# Patient Record
Sex: MALE | Race: OTHER | NOT HISPANIC OR LATINO | Employment: FULL TIME | URBAN - METROPOLITAN AREA
[De-identification: names, ages, dates, MRNs, and addresses within clinical notes are randomized per-mention and may not be internally consistent; named-entity substitution may affect disease eponyms.]

---

## 2022-01-28 ENCOUNTER — OFFICE VISIT (OUTPATIENT)
Dept: URGENT CARE | Facility: CLINIC | Age: 52
End: 2022-01-28

## 2022-01-28 VITALS
WEIGHT: 175 LBS | HEIGHT: 70 IN | HEART RATE: 98 BPM | BODY MASS INDEX: 25.05 KG/M2 | SYSTOLIC BLOOD PRESSURE: 108 MMHG | OXYGEN SATURATION: 93 % | DIASTOLIC BLOOD PRESSURE: 68 MMHG | TEMPERATURE: 97.9 F | RESPIRATION RATE: 18 BRPM

## 2022-01-28 DIAGNOSIS — Z20.822 SUSPECTED COVID-19 VIRUS INFECTION: ICD-10-CM

## 2022-01-28 DIAGNOSIS — J02.0 STREPTOCOCCAL PHARYNGITIS: Primary | ICD-10-CM

## 2022-01-28 DIAGNOSIS — J02.9 PHARYNGITIS, UNSPECIFIED ETIOLOGY: ICD-10-CM

## 2022-01-28 PROBLEM — F17.200 TOBACCO DEPENDENCE: Status: ACTIVE | Noted: 2022-01-28

## 2022-01-28 LAB
INT CON NEG: NORMAL
INT CON POS: NORMAL
S PYO AG THROAT QL: POSITIVE

## 2022-01-28 PROCEDURE — 87880 STREP A ASSAY W/OPTIC: CPT | Performed by: NURSE PRACTITIONER

## 2022-01-28 PROCEDURE — 99203 OFFICE O/P NEW LOW 30 MIN: CPT | Mod: 25,CS | Performed by: NURSE PRACTITIONER

## 2022-01-28 PROCEDURE — 96372 THER/PROPH/DIAG INJ SC/IM: CPT | Performed by: NURSE PRACTITIONER

## 2022-01-28 RX ORDER — AMOXICILLIN 500 MG/1
500 CAPSULE ORAL 2 TIMES DAILY
Qty: 20 CAPSULE | Refills: 0 | Status: SHIPPED | OUTPATIENT
Start: 2022-01-28 | End: 2022-02-07

## 2022-01-28 RX ORDER — DEXAMETHASONE SODIUM PHOSPHATE 10 MG/ML
10 INJECTION INTRAMUSCULAR; INTRAVENOUS ONCE
Status: COMPLETED | OUTPATIENT
Start: 2022-01-28 | End: 2022-01-28

## 2022-01-28 RX ADMIN — DEXAMETHASONE SODIUM PHOSPHATE 10 MG: 10 INJECTION INTRAMUSCULAR; INTRAVENOUS at 11:24

## 2022-01-28 ASSESSMENT — ENCOUNTER SYMPTOMS
MYALGIAS: 0
TROUBLE SWALLOWING: 1
NAUSEA: 0
HOARSE VOICE: 1
HEADACHES: 0
COUGH: 0
VOMITING: 0
FEVER: 0
DIARRHEA: 0
SORE THROAT: 1
CHILLS: 0
ABDOMINAL PAIN: 0

## 2022-01-28 NOTE — PROGRESS NOTES
Subjective:     Julieta Garcia is a 51 y.o. male who presents for Sore Throat (x 2 days, severe sore throat, burning, pain, congestion)      Pharyngitis   This is a new problem. The current episode started in the past 7 days (Julieta is a pleasant 51 year old male who presents to  today with complaints of a sore throat that started 2 days ago). The problem has been gradually worsening. There has been no fever. The pain is severe. Associated symptoms include congestion, a hoarse voice and trouble swallowing. Pertinent negatives include no abdominal pain, coughing, diarrhea, headaches or vomiting. He has tried nothing for the symptoms.         Review of Systems   Constitutional: Negative for chills, fever and malaise/fatigue.   HENT: Positive for congestion, hoarse voice, sore throat and trouble swallowing.    Respiratory: Negative for cough.    Gastrointestinal: Negative for abdominal pain, diarrhea, nausea and vomiting.   Musculoskeletal: Negative for myalgias.   Neurological: Negative for headaches.       PMH: History reviewed. No pertinent past medical history.  ALLERGIES: No Known Allergies  SURGHX: History reviewed. No pertinent surgical history.  SOCHX:   Social History     Socioeconomic History   • Marital status:      Spouse name: Not on file   • Number of children: Not on file   • Years of education: Not on file   • Highest education level: Not on file   Occupational History   • Not on file   Tobacco Use   • Smoking status: Never Smoker   • Smokeless tobacco: Never Used   Vaping Use   • Vaping Use: Never used   Substance and Sexual Activity   • Alcohol use: Yes     Alcohol/week: 0.6 oz     Types: 1 Glasses of wine per week   • Drug use: Never   • Sexual activity: Not on file   Other Topics Concern   • Not on file   Social History Narrative   • Not on file     Social Determinants of Health     Financial Resource Strain:    • Difficulty of Paying Living Expenses: Not on file   Food Insecurity:    •  "Worried About Running Out of Food in the Last Year: Not on file   • Ran Out of Food in the Last Year: Not on file   Transportation Needs:    • Lack of Transportation (Medical): Not on file   • Lack of Transportation (Non-Medical): Not on file   Physical Activity:    • Days of Exercise per Week: Not on file   • Minutes of Exercise per Session: Not on file   Stress:    • Feeling of Stress : Not on file   Social Connections:    • Frequency of Communication with Friends and Family: Not on file   • Frequency of Social Gatherings with Friends and Family: Not on file   • Attends [employer] Services: Not on file   • Active Member of Clubs or Organizations: Not on file   • Attends Club or Organization Meetings: Not on file   • Marital Status: Not on file   Intimate Partner Violence:    • Fear of Current or Ex-Partner: Not on file   • Emotionally Abused: Not on file   • Physically Abused: Not on file   • Sexually Abused: Not on file   Housing Stability:    • Unable to Pay for Housing in the Last Year: Not on file   • Number of Places Lived in the Last Year: Not on file   • Unstable Housing in the Last Year: Not on file     FH: History reviewed. No pertinent family history.      Objective:   /68   Pulse 98   Temp 36.6 °C (97.9 °F) (Temporal)   Resp 18   Ht 1.778 m (5' 10\")   Wt 79.4 kg (175 lb)   SpO2 93%   BMI 25.11 kg/m²     Physical Exam  Vitals and nursing note reviewed.   Constitutional:       General: He is not in acute distress.     Appearance: Normal appearance. He is normal weight. He is ill-appearing.   HENT:      Head: Normocephalic and atraumatic.      Right Ear: Tympanic membrane, ear canal and external ear normal. There is no impacted cerumen.      Left Ear: Tympanic membrane, ear canal and external ear normal. There is no impacted cerumen.      Nose: No congestion or rhinorrhea.      Mouth/Throat:      Mouth: Mucous membranes are moist.      Pharynx: Uvula midline. Pharyngeal swelling, posterior " oropharyngeal erythema and uvula swelling present. No oropharyngeal exudate.      Tonsils: No tonsillar exudate or tonsillar abscesses. 1+ on the right. 1+ on the left.      Comments: Mild uvular swelling  Eyes:      Extraocular Movements: Extraocular movements intact.      Pupils: Pupils are equal, round, and reactive to light.   Cardiovascular:      Rate and Rhythm: Normal rate and regular rhythm.      Pulses: Normal pulses.      Heart sounds: Normal heart sounds.   Pulmonary:      Effort: Pulmonary effort is normal. No respiratory distress.      Breath sounds: Normal breath sounds. No stridor. No wheezing, rhonchi or rales.   Chest:      Chest wall: No tenderness.   Abdominal:      General: Abdomen is flat. Bowel sounds are normal.      Palpations: Abdomen is soft.      Tenderness: There is no abdominal tenderness. There is no right CVA tenderness or left CVA tenderness.   Musculoskeletal:         General: Normal range of motion.      Cervical back: Normal range of motion and neck supple. No tenderness.   Lymphadenopathy:      Cervical: No cervical adenopathy.   Skin:     General: Skin is warm and dry.      Capillary Refill: Capillary refill takes less than 2 seconds.   Neurological:      General: No focal deficit present.      Mental Status: He is alert and oriented to person, place, and time. Mental status is at baseline.   Psychiatric:         Mood and Affect: Mood normal.         Behavior: Behavior normal.         Thought Content: Thought content normal.         Judgment: Judgment normal.       POCT strep: positive  Assessment/Plan:   Assessment    1. Streptococcal pharyngitis  amoxicillin (AMOXIL) 500 MG Cap   2. Pharyngitis, unspecified etiology  dexamethasone (DECADRON) injection (check route below) 10 mg    POCT Rapid Strep A   3. Suspected COVID-19 virus infection  dexamethasone (DECADRON) injection (check route below) 10 mg     We discussed supportive measures including humidifier, warm salt water  gargles, over-the-counter Cepacol throat lozenges, rest  and increased fluids. Pt was encouraged to seek treatment back in the ER or urgent care for worsening symptoms,  fever greater than 100.5, wheezes or shortness of breath.  Patient is self-pay and would like to see if he can find a rapid at home Covid test before coming back to the clinic to have this performed as he does not want to pay out of pocket.  Covid test was ordered for patient to use if needed.  So he is never been seen here before so he is in a very and not so there is definitely  AVS handout given and reviewed with patient. Pt educated on red flags and when to seek treatment back in ER or UC.

## 2022-01-28 NOTE — PATIENT INSTRUCTIONS
Strep Throat, Adult  Strep throat is an infection in the throat that is caused by bacteria. It is common during the cold months of the year. It mostly affects children who are 5-15 years old. However, people of all ages can get it at any time of the year. This infection spreads from person to person (is contagious) through coughing, sneezing, or having close contact.  Your health care provider may use other names to describe the infection. It can be called tonsillitis (if there is swelling of the tonsils), or pharyngitis (if there is swelling at the back of the throat).  What are the causes?  This condition is caused by the Streptococcus pyogenes bacteria.  What increases the risk?  You are more likely to develop this condition if:  · You care for school-age children, or are around school-age children. Children are more likely to get strep throat and may spread it to others.  · You spend time in crowded places where the infection can spread easily.  · You have close contact with someone who has strep throat.  What are the signs or symptoms?  Symptoms of this condition include:  · Fever or chills.  · Redness, swelling, or pain in the tonsils or throat.  · Pain or difficulty when swallowing.  · White or yellow spots on the tonsils or throat.  · Tender glands in the neck and under the jaw.  · Bad smelling breath.  · Red rash all over the body. This is rare.  How is this diagnosed?  This condition is diagnosed by tests that check for the presence and the amount of bacteria that cause strep throat. They are:  · Rapid strep test. Your throat is swabbed and checked for the presence of bacteria. Results are usually ready in minutes.  · Throat culture test. Your throat is swabbed. The sample is placed in a cup that allows infections to grow. Results are usually ready in 1 or 2 days.  How is this treated?  This condition may be treated with:  · Medicines that kill germs (antibiotics).  · Medicines that relieve pain or fever.  These include:  ? Ibuprofen or acetaminophen.  ? Aspirin, only for patients who are over the age of 18.  ? Throat lozenges.  ? Throat sprays.  Follow these instructions at home:  Medicines    · Take over-the-counter and prescription medicines only as told by your health care provider.  · Take your antibiotic medicine as told by your health care provider. Do not stop taking the antibiotic even if you start to feel better.  Eating and drinking    · If you have trouble swallowing, try eating soft foods until your sore throat feels better.  · Drink enough fluid to keep your urine pale yellow.  · To help relieve pain, you may have:  ? Warm fluids, such as soup and tea.  ? Cold fluids, such as frozen desserts or popsicles.  General instructions  · Gargle with a salt-water mixture 3-4 times a day or as needed. To make a salt-water mixture, completely dissolve ½-1 tsp (3-6 g) of salt in 1 cup (237 mL) of warm water.  · Get plenty of rest.  · Stay home from work or school until you have been taking antibiotics for 24 hours.  · Avoid smoking or being around people who smoke.  · Keep all follow-up visits as told by your health care provider. This is important.  How is this prevented?    · Do not share food, drinking cups, or personal items that could cause the infection to spread to other people.  · Wash your hands well with soap and water, and make sure that all people in your house wash their hands well.  · Have family members tested if they have a sore throat or fever. They may need an antibiotic if they have strep throat.  Contact a health care provider if:  · The glands in your neck continue to get bigger.  · You develop a rash, cough, or earache.  · You cough up a thick mucus that is green, yellow-brown, or bloody.  · You have pain or discomfort that does not get better with medicine.  · Your symptoms seem to be getting worse and not better.  · You have a fever.  Get help right away if:  · You have new symptoms, such as  vomiting, severe headache, stiff or painful neck, chest pain, or shortness of breath.  · You have severe throat pain, drooling, or changes in your voice.  · You have swelling of the neck, or the skin on the neck becomes red and tender.  · You have signs of dehydration, such as tiredness (fatigue), dry mouth, and decreased urination.  · You become increasingly sleepy, or you cannot wake up completely.  · Your joints become red or painful.  Summary  · Strep throat is an infection in the throat that is caused by the Streptococcus pyogenes bacteria. This infection is spread from person to person (is contagious) through coughing, sneezing, or having close contact.  · Take your medicines, including antibiotics, as told by your health care provider. Do not stop taking the antibiotic even if you start to feel better.  · To prevent the spread of germs, wash your hands well with soap and water. Have others do the same. Do not share food, drinking cups, or personal items.  · Get help right away if you have new symptoms, such as vomiting, severe headache, stiff or painful neck, chest pain, or shortness of breath.  This information is not intended to replace advice given to you by your health care provider. Make sure you discuss any questions you have with your health care provider.  Document Released: 12/15/2001 Document Revised: 03/06/2020 Document Reviewed: 03/06/2020  [employer] Patient Education © 2020 [employer]

## 2022-02-04 ENCOUNTER — TELEPHONE (OUTPATIENT)
Dept: URGENT CARE | Facility: CLINIC | Age: 52
End: 2022-02-04

## 2022-02-04 NOTE — TELEPHONE ENCOUNTER
Pt called asking if he might be able to receive the oral covid treatment, advised that he might not qualify but I would send a msg to his provider anyway. He is also asking what he might be able to do to alleviate his symptoms at home.       Advised pt to proceed to ER if there is any sudden change or worsening of his condition. He verbalized understanding and had no further questions at this time.